# Patient Record
Sex: MALE | URBAN - METROPOLITAN AREA
[De-identification: names, ages, dates, MRNs, and addresses within clinical notes are randomized per-mention and may not be internally consistent; named-entity substitution may affect disease eponyms.]

---

## 2022-01-05 ENCOUNTER — INPATIENT (INPATIENT)
Facility: HOSPITAL | Age: 32
LOS: 5 days | Discharge: HOME | DRG: 885 | End: 2022-01-11
Attending: PSYCHIATRY & NEUROLOGY | Admitting: PSYCHIATRY & NEUROLOGY
Payer: COMMERCIAL

## 2022-01-05 VITALS
TEMPERATURE: 98 F | RESPIRATION RATE: 18 BRPM | SYSTOLIC BLOOD PRESSURE: 129 MMHG | DIASTOLIC BLOOD PRESSURE: 89 MMHG | OXYGEN SATURATION: 97 % | HEART RATE: 82 BPM

## 2022-01-05 RX ORDER — ZOLPIDEM TARTRATE 10 MG/1
5 TABLET ORAL AT BEDTIME
Refills: 0 | Status: DISCONTINUED | OUTPATIENT
Start: 2022-01-05 | End: 2022-01-11

## 2022-01-05 RX ORDER — SERTRALINE 25 MG/1
75 TABLET, FILM COATED ORAL DAILY
Refills: 0 | Status: DISCONTINUED | OUTPATIENT
Start: 2022-01-05 | End: 2022-01-11

## 2022-01-05 RX ORDER — HALOPERIDOL DECANOATE 100 MG/ML
5 INJECTION INTRAMUSCULAR EVERY 6 HOURS
Refills: 0 | Status: DISCONTINUED | OUTPATIENT
Start: 2022-01-05 | End: 2022-01-11

## 2022-01-05 RX ORDER — ACETAMINOPHEN 500 MG
650 TABLET ORAL EVERY 6 HOURS
Refills: 0 | Status: DISCONTINUED | OUTPATIENT
Start: 2022-01-05 | End: 2022-01-11

## 2022-01-05 RX ORDER — LURASIDONE HYDROCHLORIDE 40 MG/1
20 TABLET ORAL DAILY
Refills: 0 | Status: DISCONTINUED | OUTPATIENT
Start: 2022-01-05 | End: 2022-01-06

## 2022-01-05 RX ORDER — MAGNESIUM HYDROXIDE 400 MG/1
30 TABLET, CHEWABLE ORAL DAILY
Refills: 0 | Status: DISCONTINUED | OUTPATIENT
Start: 2022-01-05 | End: 2022-01-11

## 2022-01-05 RX ADMIN — ZOLPIDEM TARTRATE 5 MILLIGRAM(S): 10 TABLET ORAL at 21:37

## 2022-01-05 NOTE — BH PATIENT PROFILE - FALL HARM RISK - UNIVERSAL INTERVENTIONS
Non-slip footwear when patient is out of bed/Physically safe environment - no spills, clutter or unnecessary equipment/Purposeful Proactive Rounding/Room/bathroom lighting operational, light cord in reach

## 2022-01-06 DIAGNOSIS — F39 UNSPECIFIED MOOD [AFFECTIVE] DISORDER: ICD-10-CM

## 2022-01-06 PROCEDURE — 99223 1ST HOSP IP/OBS HIGH 75: CPT

## 2022-01-06 RX ADMIN — ZOLPIDEM TARTRATE 5 MILLIGRAM(S): 10 TABLET ORAL at 22:09

## 2022-01-06 RX ADMIN — LURASIDONE HYDROCHLORIDE 20 MILLIGRAM(S): 40 TABLET ORAL at 09:36

## 2022-01-06 RX ADMIN — SERTRALINE 75 MILLIGRAM(S): 25 TABLET, FILM COATED ORAL at 09:36

## 2022-01-06 NOTE — BH INPATIENT PSYCHIATRY ASSESSMENT NOTE - NSBHCHARTREVIEWVS_PSY_A_CORE FT
Vital Signs Last 24 Hrs  T(C): 36.7 (01-06-22 @ 10:41), Max: 36.7 (01-06-22 @ 10:41)  T(F): 98 (01-06-22 @ 10:41), Max: 98 (01-06-22 @ 10:41)  HR: 98 (01-06-22 @ 10:41) (82 - 98)  BP: 119/86 (01-06-22 @ 10:41) (119/86 - 129/89)  BP(mean): --  RR: 18 (01-06-22 @ 10:41) (18 - 18)  SpO2: 98% (01-06-22 @ 10:41) (97% - 98%)     Vital Signs Last 24 Hrs  T(C): 36.7 (01-06-22 @ 16:58), Max: 36.7 (01-06-22 @ 10:41)  T(F): 98.1 (01-06-22 @ 16:58), Max: 98.1 (01-06-22 @ 16:58)  HR: 81 (01-06-22 @ 16:58) (81 - 98)  BP: 128/82 (01-06-22 @ 16:58) (119/86 - 129/89)  BP(mean): --  RR: 17 (01-06-22 @ 16:58) (17 - 18)  SpO2: 96% (01-06-22 @ 16:58) (96% - 98%)

## 2022-01-06 NOTE — BH INPATIENT PSYCHIATRY ASSESSMENT NOTE - NSPRESENTSXS_PSY_ALL_CORE
Depressed mood/Anhedonia/Hopelessness or despair/Global insomnia Depressed mood/Anhedonia/Hopelessness or despair/Severe anxiety, agitation or panic

## 2022-01-06 NOTE — BH INPATIENT PSYCHIATRY ASSESSMENT NOTE - NSBHASSESSSUMMFT_PSY_ALL_CORE
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 Medical hx significant for familial hypertriglyceridemia, pancreatis, not treated with medications, controlled with diet. Psychiatric hx significant for Bipolar d/o vs MDD, no prior inpatient admissions, currently in treatment with a psychiatrist- receiving zoloft 50mg, latuda 20mg, ambien prn. No currently in psychotherapy. Denies hx of prior suicide attempts, denies SIB. Denies drug/etoh/tobacco use. No hx of legal/criminal issues. Patient presents to [facility] accompanied by his  after expressing SI. Patient transferred to [facility] and admitted to [address] [place] [address].   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 Medical hx significant for familial hypertriglyceridemia, pancreatis, and NIDDM not treated with medications, controlled with diet. Psychiatric hx significant for Bipolar d/o vs MDD, no prior inpatient admissions, currently in treatment with a psychiatrist- receiving zoloft 50mg, latuda 20mg, ambien prn. No currently in psychotherapy. Denies hx of prior suicide attempts, denies SIB. Denies drug/etoh/tobacco use. No hx of legal/criminal issues. Patient [place] [facility] [place] by his  after expressing SI and being unable to complete his tasks (pt had been recently sent home because of his inability co complete his tanks. Patient transferred to [facility] to preserve his privacy and confidentiality and was admitted to [address] [place] [address].   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 Medical hx significant for familial hypertriglyceridemia, pancreatis, and NIDDM not treated with medications, controlled with diet. Psychiatric hx significant for Bipolar d/o vs MDD, no prior inpatient admissions, currently in treatment with a psychiatrist- receiving zoloft 50mg, latuda 20mg, ambien prn. No currently in psychotherapy. Denies hx of prior suicide attempts, denies SIB. Denies drug/etoh/tobacco use. No hx of legal/criminal issues. Patient presented to [facility] accompanied by his  after expressing SI and being unable to complete his tasks (pt had been recently sent home because of his inability co complete his tanks. Patient transferred to [facility] to preserve his privacy and confidentiality and was admitted to [address] [place] [address].    -Zoloft increased from 50mg to 75mg  -Latuda 20mg every evening with meal-plan to uptitrate as appropriate  -Ambien 5mg prn qhs  -Appropriate prns available for activation  -Nutrition consult placed- consistent carb diet ordered

## 2022-01-06 NOTE — BH INPATIENT PSYCHIATRY ASSESSMENT NOTE - NSBHCONSBHPROVDETAILS_PSY_A_CORE  FT
Collateral obtained by ED physician from patient's outpatient psychiatrist who is currently on vacation

## 2022-01-06 NOTE — BH INPATIENT PSYCHIATRY ASSESSMENT NOTE - NSBHMETABOLIC_PSY_ALL_CORE_FT
BMI: BMI (kg/m2): 25.3 (01-05-22 @ 18:42)  HbA1c:   Glucose:   BP: 119/86 (01-06-22 @ 10:41) (119/86 - 129/89)  Lipid Panel:  BMI: BMI (kg/m2): 25.3 (01-05-22 @ 18:42)  HbA1c:   Glucose:   BP: 128/82 (01-06-22 @ 16:58) (119/86 - 129/89)  Lipid Panel:

## 2022-01-06 NOTE — BH INPATIENT PSYCHIATRY ASSESSMENT NOTE - PATIENT'S CHIEF COMPLAINT
Pneumonia (Adult)  Pneumonia is an infection deep within the lungs. It is in the small air sacs (alveoli). Pneumonia may be caused by a virus or bacteria. Pneumonia caused by bacteria is usually treated with an antibiotic. Severe cases may need to be treated in the hospital. Milder cases can be treated at home. Symptoms usually start to get better during the first 2 days of treatment.    Home care  Follow these guidelines when caring for yourself at home:  · Rest at home for the first 2 to 3 days, or until you feel stronger. Dont let yourself get overly tired when you go back to your activities.  · Stay away from cigarette smoke - yours or other peoples.  · You may use acetaminophen or ibuprofen to control fever or pain, unless another medicine was prescribed. If you have chronic liver or kidney disease, talk with your healthcare provider before using these medicines. Also talk with your provider if youve had a stomach ulcer or gastrointestinal bleeding. Dont give aspirin to anyone younger than 18 years of age who is ill with a fever. It may cause severe liver damage.  · Your appetite may be poor, so a light diet is fine.  · Drink 6 to 8 glasses of fluids every day to make sure you are getting enough fluids. Beverages can include water, sport drinks, sodas without caffeine, juices, tea, or soup. Fluids will help loosen secretions in the lung. This will make it easier for you to cough up the phlegm (sputum). If you also have heart or kidney disease, check with your healthcare provider before you drink extra fluids.  · Take antibiotic medicine prescribed until it is all gone, even if you are feeling better after a few days.  Follow-up care  Follow up with your healthcare provider in the next 2 to 3 days, or as advised. This is to be sure the medicine is helping you get better.  If you are 65 or older, you should get a pneumococcal vaccine and a yearly flu (influenza) shot. You should also get these vaccines if  you have chronic lung disease like asthma, emphysema, or COPD. Recently, a second type of pneumonia vaccine has become available for everyone over 65 years old. This is in addition to the previous vaccine. Ask your provider about this.  When to seek medical advice  Call your healthcare provider right away if any of these occur:  · You dont get better within the first 48 hours of treatment  · Shortness of breath gets worse  · Rapid breathing (more than 25 breaths per minute)  · Coughing up blood  · Chest pain gets worse with breathing  · Fever of 100.4°F (38°C) or higher that doesnt get better with fever medicine  · Weakness, dizziness, or fainting that gets worse  · Thirst or dry mouth that gets worse  · Sinus pain, headache, or a stiff neck  · Chest pain not caused by coughing  Date Last Reviewed: 1/1/2017  © 5374-5797 [employer]. [address] [place] [id number]. All rights reserved. This information is not intended as a substitute for professional medical care. Always follow your healthcare professional's instructions.         I'm depressed

## 2022-01-06 NOTE — BH PSYCHOLOGY - ADMISSION/NEUROPSYCHOLOGY NOTE - SUMMARY
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 HAs been feeling depressed and anxious, increasingly, with pandemic, hospital staff shortages, worries that he may do something wrong. Recent stressors include working 12-14 hrs shifts, problems in payroll that he did not get paid in 6 months, recently grandparents passed from covid, has been feeling guilty, overwhelmed, has been feeling irritable. His BDI is 25.Reported at times its hard to concentrate. Denied any plans or intent for suicide, but reported increased preoccupations with death. The best diagnosis at this time is MDD- severe.

## 2022-01-06 NOTE — BH PSYCHOLOGY - ADMISSION/NEUROPSYCHOLOGY NOTE - NSBHPSYCHOLID_PSY_A_CORE FT
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

## 2022-01-06 NOTE — BH INPATIENT PSYCHIATRY ASSESSMENT NOTE - HPI (INCLUDE ILLNESS QUALITY, SEVERITY, DURATION, TIMING, CONTEXT, MODIFYING FACTORS, ASSOCIATED SIGNS AND SYMPTOMS)
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 Medical hx significant for familial hypertriglyceridemia, pancreatis, not treated with medications, controlled with diet. Psychiatric hx significant for Bipolar d/o vs MDD, no prior inpatient admissions, currently in treatment with a psychiatrist- receiving zoloft 50mg, latuda 20mg, ambien prn. No currently in psychotherapy. Denies hx of prior suicide attempts, denies SIB. Denies drug/etoh/tobacco use. No hx of legal/criminal issues. [facility] accompanied by his  after expressing SI. Patient transferred to [facility] and admitted to [address] [place] [address].     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 Medical hx significant for familial hypertriglyceridemia, pancreatis, and NIDDM not treated with medications, controlled with diet. Psychiatric hx significant for Bipolar d/o vs MDD, no prior inpatient admissions, currently in treatment with a psychiatrist- receiving zoloft 50mg, latuda 20mg, ambien prn. No currently in psychotherapy. Denies hx of prior suicide attempts, denies SIB. Denies drug/etoh/tobacco use. No hx of legal/criminal issues. Patient presented to [facility] accompanied by his  after expressing SI. Patient transferred to [facility] to preserve his privacy and confidentialy and was admitted to [address] [place] [address].    Patient states that he recently emailed his  about concerns that he had regarding Covid infection rates in the hospital in addition to remind him about the program needing additional providers due to being short staffed. He reports that the  had him come in the next day and patient reported having suicidal ideations 'no concrete plans, more passive' thoughts. His Director, Dr. Rich subsequently walked him to the [facility] for an evaluation. Per documentation pt had made a statement regarding wanting 'to finish off things.' Prior to this email pt had been counseled by the Director for being too aggressive and irritable towards HR after he expressed frustration to HR after not being paid for the last 6 months and hanging up the phone on the after his most recent attempt to sort this issue out. Patient's family has been helping him financially. 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 He has wondered if he should quit the field. He denies that he has been counselor regarding his work clinically.     He is currently in treatment with his psychiatrist Dr. Willis of 2 years and has been getting sertraline 50mg, Latuda 20mg and ambien 10mg prn for treatment of his depression and 'irritability.' He had been seeing Dr. Willis regularly every 2-3 months, but that has recently changed due to scheduling conflicts and now they are emailing back and forth. He feels that his medications do help and if he were to stop taking them his depression would worsen. He wonders if dosage needs to be adjusted. He irregularly takes his Latuda due to it being expensive and him only being able to get samples, but he does acknowledge improvement in sxs when he does take it. No reported side effects of regimen. His first contact with mental health providers was at age [age] when he was thought to have an adjustment disorder and OCD (both rituals and thoughts). He had been treated with medications (does not recall name) but side effect of weight gain was intolerable. He reports being anxious at baseline, endorsing anxiety in general, frequently ruminating and having multiple racing thoughts.      He states that he started to work as an Intern in May and has been working up to 6 days a week usually from [id number] but staying an additional 2 hours each evening due to workload. He has concerns about Covid and feels that he could personally change the outcome of Covid related deaths. He acknowledged feeling targeted during this pandemic, both at work and outside of work due to his ethnicity.     He denies prior suicide attempts, no self injurious behaviors, no thoughts of harming others. Denies avh or paranoid ideations. Endorses suicidal ideations such as jumping off a tall building or crashing his car. Patient's father  at age 28 in  after driving into a truck, it is unclear if this was a suicide.   Patient denies any hx of drug/etoh/tobacco use.  Has hx of insomnia and regularly uses ambien each night before a work shift. He denies changes in appetite but has gained 18lbs over the last 6 months, unintentionally.    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 Medical hx significant for familial hypertriglyceridemia, pancreatis, and NIDDM not treated with medications, controlled with diet. Psychiatric hx significant for Bipolar d/o vs MDD, no prior inpatient admissions, currently in treatment with a psychiatrist- receiving zoloft 50mg, latuda 20mg, ambien prn. No currently in psychotherapy. Denies hx of prior suicide attempts, denies SIB. Denies drug/etoh/tobacco use. No hx of legal/criminal issues. Patient presented to [facility] accompanied by his  after expressing SI and being unable to complete his tasks (pt had been recently sent home because of his inability co complete his tanks. Patient transferred to [facility] to preserve his privacy and confidentiality and was admitted to [address] [place] [address].    Patient states that he recently emailed his  about concerns that he had regarding Covid infection rates in the hospital in addition to remind him about the program needing additional providers due to being short staffed. He reports that the  had him come in the next day and patient reported having suicidal ideations 'no concrete plans, more passive' thoughts. His Director, Dr. Rich subsequently walked him to the [facility] for an evaluation. Per documentation pt had made a statement regarding wanting 'to finish off things.' Prior to this email pt had been counseled by the Director for being too aggressive and irritable towards HR after he expressed frustration to HR after not being paid for the last 6 months and hanging up the phone on the after his most recent attempt to sort this issue out. Patient's family has been helping him financially. 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 He has wondered if he should quit the field. He denies that he has been counselor regarding his work clinically. He feels hopeless, overwhelmed and guilty related to his abiligy to help patients as needed.    He is currently in treatment with his psychiatrist Dr. Willis of 2 years and has been getting sertraline 50mg, Latuda 20mg and ambien 10mg prn for treatment of his depression and 'irritability.' He had been seeing Dr. Willis regularly every 2-3 months, but that has recently changed due to scheduling conflicts and now they are emailing back and forth. He feels that his medications do help and if he were to stop taking them his depression would worsen. He wonders if dosage needs to be adjusted. He irregularly takes his Latuda due to it being expensive and him only being able to get samples, but he does acknowledge improvement in sxs when he does take it. No reported side effects of regimen. His first contact with mental health providers was at age [age] when he was thought to have an adjustment disorder and OCD (both rituals and thoughts). He had been treated with medications (does not recall name) but side effect of weight gain was intolerable. He reports being anxious at baseline, endorsing anxiety in general, frequently ruminating and having multiple racing thoughts.      He states that he started to work as an Intern in May and has been working up to 6 days a week usually from [id number] but staying an additional 2 hours each evening due to workload. He has concerns about Covid and feels that he could personally change the outcome of Covid related deaths. He acknowledged feeling targeted during this pandemic, both at work and outside of work due to his ethnicity.     He denies prior suicide attempts, no self injurious behaviors, no thoughts of harming others. Denies avh or paranoid ideations. Endorses suicidal ideations such as jumping off a tall building or crashing his car. Patient's father  at age 28 in  after driving into a truck, it is unclear if this was a suicide.   Patient denies any hx of drug/etoh/tobacco use.  Has hx of insomnia and regularly uses ambien each night before a work shift. He denies changes in appetite but has gained 18lbs over the last 6 months, unintentionally.    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 Medical hx significant for familial hypertriglyceridemia, pancreatis, and NIDDM not treated with medications, controlled with diet. Psychiatric hx significant for Bipolar d/o vs MDD, no prior inpatient admissions, currently in treatment with a psychiatrist- receiving zoloft 50mg, latuda 20mg, ambien prn. No currently in psychotherapy. Denies hx of prior suicide attempts, denies SIB. Denies drug/etoh/tobacco use. No hx of legal/criminal issues. Patient presented to [facility] accompanied by his  after expressing SI and being unable to complete his tasks (pt had been recently sent home because of his inability to complete his work related duties. Patient transferred to [facility] to preserve his privacy and confidentiality and was admitted to [address] [place] [address].    Patient states that he recently emailed his  about concerns that he had regarding Covid infection rates in the hospital in addition to reminding him about the program needing additional providers due to being short staffed. He reports that the  had him come in the next day and patient reported having suicidal ideations 'no concrete plans, more passive' thoughts. His Director, Dr. Rich subsequently walked him to the C[facility] for an evaluation. Per documentation pt had made a statement regarding wanting 'to finish off things.' Prior to this email pt had been counseled by the Director for being too aggressive and irritable towards HR after he expressed frustration to HR after not being paid for the last 6 months and hanging up the phone on them after his most recent attempt to sort this issue out. Patient's family has been helping him financially. 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 He has wondered if he should quit the field. He denies that he has been counseled regarding his work clinically. He feels hopeless, overwhelmed and guilty related to his ability to help patients as needed.    He is currently in treatment with his psychiatrist Dr. Willis of 2 years and has been getting sertraline 50mg, Latuda 20mg and ambien 10mg prn for treatment of his depression and 'irritability.' He had been seeing Dr. Willis regularly every 2-3 months, but that has recently changed due to scheduling conflicts and now they are emailing back and forth. He feels that his medications do help and if he were to stop taking them his depression would worsen. He wonders if dosage needs to be adjusted. He irregularly takes his Latuda due to it being expensive and him only being able to get samples, but he does acknowledge improvement in sxs when he does take it. No reported side effects of regimen. His first contact with mental health providers was at age [age] when he was thought to have an adjustment disorder and OCD (both rituals and thoughts). He had been treated with medications (does not recall name) but side effect of weight gain was intolerable. He reports being anxious at baseline, endorsing anxiety in general, frequently ruminating and having multiple racing thoughts.      He states that he started to work as an Intern in May and has been working up to 6 days a week usually from 0105-0012 but staying an additional 2 hours each evening due to workload. He has concerns about Covid and feels that he could personally change the outcome of Covid related deaths. He acknowledged feeling targeted during this pandemic, both at work and outside of work due to his ethnicity.     He denies prior suicide attempts, no self injurious behaviors, no thoughts of harming others. Denies avh or paranoid ideations. Endorses suicidal ideations such as jumping off a tall building or crashing his car. Patient's father  at age 27 in  after driving into a truck, it is unclear if this was a suicide.   Patient denies any hx of drug/etoh/tobacco use.  Has hx of insomnia and regularly uses ambien each night before a work shift. He denies changes in appetite but has gained 18lbs over the last 6 months, unintentionally.    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 Medical hx significant for familial hypertriglyceridemia, pancreatis, and NIDDM not treated with medications, controlled with diet. Psychiatric hx significant for Bipolar d/o vs MDD, no prior inpatient admissions, currently in treatment with a psychiatrist- receiving zoloft 50mg, latuda 20mg, ambien prn. No currently in psychotherapy. Denies hx of prior suicide attempts, denies SIB. Denies drug/etoh/tobacco use. No hx of legal/criminal issues. Patient presented to [facility] accompanied by his  after expressing SI and being unable to complete his tasks (pt had been recently sent home because of his inability to complete his work related duties. Patient transferred to [facility] to preserve his privacy and confidentiality and was admitted to [address] [place] [address].    Patient states that he recently emailed his  about concerns that he had regarding Covid infection rates in the hospital in addition to reminding him about the program needing additional providers due to being short staffed. He reports that the  had him come in the next day and patient reported having suicidal ideations 'no concrete plans, more passive' thoughts. His Director, Dr. Rich subsequently walked him to the CPE[facility] for an evaluation. Per documentation pt had made a statement regarding wanting 'to finish off things.' Prior to this email pt had been counseled by the Director for being too aggressive and irritable towards HR after he expressed frustration to HR after not being paid for the last 6 months and hanging up the phone on them after his most recent attempt to sort this issue out. Patient's family has been helping him financially. 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 He has wondered if he should quit the field. He denies that he has been counseled regarding his work clinically. He feels hopeless, overwhelmed and guilty related to his ability to help patients as needed. Patient is feeling burned out. Also both maternal grandparents recently passed away due to Covid.    He is currently in treatment with his psychiatrist Dr. Willis of 2 years and has been getting sertraline 50mg, Latuda 20mg and ambien 10mg prn for treatment of his depression and 'irritability.' He had been seeing Dr. Willis regularly every 2-3 months, but that has recently changed due to scheduling conflicts and now they are emailing back and forth. He feels that his medications do help and if he were to stop taking them his depression would worsen. He wonders if dosage needs to be adjusted. He irregularly takes his Latuda due to it being expensive and him only being able to get samples, but he does acknowledge improvement in sxs when he does take it. No reported side effects of regimen. His first contact with mental health providers was at age [age] when he was thought to have an adjustment disorder and OCD (both rituals and thoughts). He had been treated with medications (does not recall name) but side effect of weight gain was intolerable. He reports being anxious at baseline, endorsing anxiety in general, frequently ruminating and having multiple racing thoughts.      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 He has concerns about Covid and feels that he could personally change the outcome of Covid related deaths. He acknowledged feeling targeted during this pandemic, both at work and outside of work due to his ethnicity.     He denies prior suicide attempts, no self injurious behaviors, no thoughts of harming others. Denies avh or paranoid ideations. Endorses suicidal ideations such as jumping off a tall building or crashing his car. Patient's father  at age 28 in  after driving into a truck, it is unclear if this was a suicide.   Patient denies any hx of drug/etoh/tobacco use.  Has hx of insomnia and regularly uses ambien each night before a work shift. He denies changes in appetite but has gained 18lbs over the last 6 months, unintentionally.     Collateral obtained from mother Suki [phone] who states that patient shared concern about being sued by his patients due to negligence and understaffing. He has been perseverating and ruminating on him not being able to check bp/temps or transport pts to [employer] etc himself. He has been routinely working until midnight, hours after his shift has ended. He has also been thinking about other people not doing their jobs well enough. She does not believe that he has been taking his medications

## 2022-01-06 NOTE — BH PSYCHOLOGY - ADMISSION/NEUROPSYCHOLOGY NOTE - NSBHSUICIDERISKFT_PSY_ALL_CORE
Static: pot family history of suicide, hx of suicidal ideation, anxiety  Modifiable: current depressive episode and anxiety  Protective: family, intelligent, domiciled, motivated for help

## 2022-01-06 NOTE — BH INPATIENT PSYCHIATRY ASSESSMENT NOTE - RISK ASSESSMENT
Static: Mental illness, gender  Modifiable: current employment/financial issues, current mood sxs, current access to treatment and care  Protective: involved family, residential status, currently has psychiatrist, insight into illness and need for treatment

## 2022-01-06 NOTE — CHART NOTE - NSCHARTNOTEFT_GEN_A_CORE
[facility]  PHYSICAL EXAM: Agree/Declined    VITALS: T(C): 36.6 (01-05-22 @ 18:39), Max: 36.6 (01-05-22 @ 18:39)  HR: 82 (01-05-22 @ 18:39) (82 - 82)  BP: 129/89 (01-05-22 @ 18:39) (129/89 - 129/89)  RR: 18 (01-05-22 @ 18:39) (18 - 18)  SpO2: 97% (01-05-22 @ 18:39) (97% - 97%)      GENERAL: NAD, comfortable, ambulating  HEAD:  Atraumatic, Normocephalic  EYES: EOMI, PERRLA, conjunctiva and sclera clear  ENT: Moist mucous membranes  NECK: Supple, No JVD  CHEST/LUNG: Clear to auscultation bilaterally; No rales, rhonchi, wheezing, or rubs. Unlabored respirations  HEART: Regular rate and rhythm; No murmurs, rubs, or gallops  ABDOMEN: BSx4; Soft, nontender, nondistended  EXTREMITIES:  No clubbing, cyanosis, or edema  NERVOUS SYSTEM:  A&Ox3, no focal deficits   SKIN: No rashes or lesions C  PHYSICAL EXAM: Agree    VITALS: T(C): 36.7 (01-06-22 @ 10:41), Max: 36.7 (01-06-22 @ 10:41)  HR: 98 (01-06-22 @ 10:41) (82 - 98)  BP: 119/86 (01-06-22 @ 10:41) (119/86 - 129/89)  RR: 18 (01-06-22 @ 10:41) (18 - 18)  SpO2: 98% (01-06-22 @ 10:41) (97% - 98%)      GENERAL: NAD, comfortable, ambulating  HEAD:  Atraumatic, Normocephalic  EYES: EOMI, PERRLA, conjunctiva and sclera clear  ENT: Moist mucous membranes  NECK: Supple, No JVD  CHEST/LUNG: Clear to auscultation bilaterally; No rales, rhonchi, wheezing, or rubs. Unlabored respirations  HEART: Regular rate and rhythm; No murmurs, rubs, or gallops  ABDOMEN: BSx4; Soft, nontender, nondistended  EXTREMITIES:  No clubbing, cyanosis, or edema, b/l pulses 2+  NERVOUS SYSTEM:  A&Ox3, no focal deficits   SKIN: No rashes or lesions

## 2022-01-06 NOTE — BH INPATIENT PSYCHIATRY ASSESSMENT NOTE - CURRENT MEDICATION
MEDICATIONS  (STANDING):  lurasidone 20 milliGRAM(s) Oral daily  sertraline 75 milliGRAM(s) Oral daily    MEDICATIONS  (PRN):  acetaminophen     Tablet .. 650 milliGRAM(s) Oral every 6 hours PRN Moderate Pain (4 - 6)  aluminum hydroxide/magnesium hydroxide/simethicone Suspension 30 milliLiter(s) Oral every 4 hours PRN Dyspepsia  haloperidol     Tablet 5 milliGRAM(s) Oral every 6 hours PRN agitation  LORazepam     Tablet 2 milliGRAM(s) Oral every 6 hours PRN agitation  magnesium hydroxide Suspension 30 milliLiter(s) Oral daily PRN Constipation  zolpidem 5 milliGRAM(s) Oral at bedtime PRN Insomnia  zolpidem 5 milliGRAM(s) Oral at bedtime PRN Insomnia   MEDICATIONS  (STANDING):  sertraline 75 milliGRAM(s) Oral daily    MEDICATIONS  (PRN):  acetaminophen     Tablet .. 650 milliGRAM(s) Oral every 6 hours PRN Moderate Pain (4 - 6)  aluminum hydroxide/magnesium hydroxide/simethicone Suspension 30 milliLiter(s) Oral every 4 hours PRN Dyspepsia  haloperidol     Tablet 5 milliGRAM(s) Oral every 6 hours PRN agitation  LORazepam     Tablet 2 milliGRAM(s) Oral every 6 hours PRN agitation  magnesium hydroxide Suspension 30 milliLiter(s) Oral daily PRN Constipation  zolpidem 5 milliGRAM(s) Oral at bedtime PRN Insomnia  zolpidem 5 milliGRAM(s) Oral at bedtime PRN Insomnia

## 2022-01-06 NOTE — BH PSYCHOLOGY - ADMISSION/NEUROPSYCHOLOGY NOTE - HPI (INCLUDE ILLNESS QUALITY, SEVERITY, DURATION, TIMING, CONTEXT, MODIFYING FACTORS, ASSOCIATED SIGNS AND SYMPTOMS)
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 Pt reported that his  said that he was looking at things in catastrophic ways, and was worried about pts well being, so he walked pt tp ed for evaluation, but afterwards, in ED, although they were going to DC pt, they found out about family history of father potentially having had killed himself, so pt is admitted on 2PC status. Pt reported that he had no concrete plans to kill himself, but has been thinking about death more frequently. Pt also has been feeling more easily irritable and frustrated. In the past he had times when he felt suicidal and had thoughts of how he would do it, only when he was faced with the way, such as being on a tall building and thinkign of how he would jump, but more recently, pt reported that he did not have these plans, and did not come up with a plan. Pt reported that he has felt worse than he is feeling at this moment, but being admitted on 2PC status was very upsetting to the patient. 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 Pt reported that at times this causes ruminations, and also pt finds it difficult to concentrate . He has gained about 18 pounds in the last month, most likely due to over eating.     Denied any substance and alcohol use, denied AVH.   Currently seeing Dr. Sawyer for psychopharmacology only, where he is on Sertraline 50mg, Latuda 20 and Ambien 10mg. Pt reported that he had been seeing Dr. Sawyer for the last 2-3 yrs only for meds. At age 17 or 19, he was diagnosed with adjustment disorder and OCD, but the issues were resolved after he went to college. Pt reported that at that time he was also on meds for Bipolar disorder which helped and at one point was diagnosed with Bipolar II vs MDD- Bipolar. But denied any nilsa.   Pt currently feels that the stressors include: being in the hospital and worrying that he will cause a mistake and a tragedy is going to happen, that could have been prevented. 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 PT also reported that he has not been paid in the last 6 months, because there has been a glitch but has given up on trying to resolve, and one day got frustrated with one of the employees and hang up the phone, but was told that his behavior is unacceptable by school. Pt reported that he agrees that he became irritable more recently and gets irritable, which is not how a professional would behave. Pt reported that his family is helping him financially and is going through his savings.     Pt reported that his father may have committed suicide, as he has  in a car crash when pt was 5 and he himself was 28 yrs old, after father told his family that this will be the last time he sees the pt and  in a car crash. Pt reported that family does not talk about mental  health, so its not talked about. Furthermore, pt reported that he was born in [place] and grew up in [facility]. his mother still lives there and he also has a 22 yo and 12 yr old siblings that he has a good relationship with.   Pt reported that Covid pandemic has been difficult, and his maternal grandparents  of Covid in the last couple of months.

## 2022-01-07 PROCEDURE — 99233 SBSQ HOSP IP/OBS HIGH 50: CPT

## 2022-01-07 RX ORDER — LURASIDONE HYDROCHLORIDE 40 MG/1
20 TABLET ORAL
Refills: 0 | Status: DISCONTINUED | OUTPATIENT
Start: 2022-01-07 | End: 2022-01-11

## 2022-01-07 RX ADMIN — ZOLPIDEM TARTRATE 5 MILLIGRAM(S): 10 TABLET ORAL at 22:12

## 2022-01-07 RX ADMIN — LURASIDONE HYDROCHLORIDE 20 MILLIGRAM(S): 40 TABLET ORAL at 19:58

## 2022-01-07 RX ADMIN — SERTRALINE 75 MILLIGRAM(S): 25 TABLET, FILM COATED ORAL at 09:56

## 2022-01-07 NOTE — BH INPATIENT PSYCHIATRY PROGRESS NOTE - NSBHCHARTREVIEWVS_PSY_A_CORE FT
Vital Signs Last 24 Hrs  T(C): 37.1 (01-07-22 @ 10:08), Max: 37.1 (01-07-22 @ 10:08)  T(F): 98.8 (01-07-22 @ 10:08), Max: 98.8 (01-07-22 @ 10:08)  HR: 70 (01-07-22 @ 10:08) (70 - 81)  BP: 129/85 (01-07-22 @ 10:08) (128/82 - 129/85)  BP(mean): --  RR: 20 (01-07-22 @ 10:08) (17 - 20)  SpO2: 97% (01-07-22 @ 10:08) (96% - 97%)

## 2022-01-07 NOTE — BH INPATIENT PSYCHIATRY PROGRESS NOTE - NSBHMETABOLIC_PSY_ALL_CORE_FT
BMI: BMI (kg/m2): 25.3 (01-05-22 @ 18:42)  HbA1c:   Glucose:   BP: 129/85 (01-07-22 @ 10:08) (119/86 - 129/89)  Lipid Panel:

## 2022-01-07 NOTE — PSYCHIATRIC REHAB INITIAL EVALUATION - NSBHPRTOOLBOX_PSY_ALL_CORE
Playing piano (doing less so now that he is working), online lobo with brothers, walking in the park/Entertainment/Family support/Outdoor activity/Reading/Other

## 2022-01-08 PROCEDURE — 99232 SBSQ HOSP IP/OBS MODERATE 35: CPT

## 2022-01-08 RX ADMIN — ZOLPIDEM TARTRATE 5 MILLIGRAM(S): 10 TABLET ORAL at 21:27

## 2022-01-08 RX ADMIN — SERTRALINE 75 MILLIGRAM(S): 25 TABLET, FILM COATED ORAL at 09:57

## 2022-01-08 RX ADMIN — LURASIDONE HYDROCHLORIDE 20 MILLIGRAM(S): 40 TABLET ORAL at 19:20

## 2022-01-08 NOTE — BH INPATIENT PSYCHIATRY PROGRESS NOTE - NSBHCHARTREVIEWVS_PSY_A_CORE FT
Vital Signs Last 24 Hrs  T(C): 36.8 (01-08-22 @ 17:20), Max: 36.8 (01-08-22 @ 17:20)  T(F): 98.3 (01-08-22 @ 17:20), Max: 98.3 (01-08-22 @ 17:20)  HR: 85 (01-08-22 @ 17:20) (78 - 85)  BP: 125/84 (01-08-22 @ 17:20) (125/84 - 145/83)  BP(mean): --  RR: 18 (01-08-22 @ 17:20) (18 - 18)  SpO2: 96% (01-08-22 @ 17:20) (96% - 96%)

## 2022-01-08 NOTE — BH INPATIENT PSYCHIATRY PROGRESS NOTE - NSBHMETABOLIC_PSY_ALL_CORE_FT
BMI: BMI (kg/m2): 25.3 (01-05-22 @ 18:42)  HbA1c:   Glucose:   BP: 125/84 (01-08-22 @ 17:20) (119/86 - 145/83)  Lipid Panel:

## 2022-01-09 PROCEDURE — 99232 SBSQ HOSP IP/OBS MODERATE 35: CPT

## 2022-01-09 RX ADMIN — LURASIDONE HYDROCHLORIDE 20 MILLIGRAM(S): 40 TABLET ORAL at 17:27

## 2022-01-09 RX ADMIN — SERTRALINE 75 MILLIGRAM(S): 25 TABLET, FILM COATED ORAL at 10:08

## 2022-01-09 RX ADMIN — ZOLPIDEM TARTRATE 5 MILLIGRAM(S): 10 TABLET ORAL at 21:27

## 2022-01-09 NOTE — BH INPATIENT PSYCHIATRY PROGRESS NOTE - NSBHCHARTREVIEWVS_PSY_A_CORE FT
Vital Signs Last 24 Hrs  T(C): 36.8 (01-08-22 @ 17:20), Max: 36.8 (01-08-22 @ 17:20)  T(F): 98.3 (01-08-22 @ 17:20), Max: 98.3 (01-08-22 @ 17:20)  HR: 85 (01-08-22 @ 17:20) (78 - 85)  BP: 125/84 (01-08-22 @ 17:20) (125/84 - 145/83)  BP(mean): --  RR: 18 (01-08-22 @ 17:20) (18 - 18)  SpO2: 96% (01-08-22 @ 17:20) (96% - 96%)     Vital Signs Last 24 Hrs  T(C): 37.1 (01-09-22 @ 09:37), Max: 37.1 (01-09-22 @ 09:37)  T(F): 98.7 (01-09-22 @ 09:37), Max: 98.7 (01-09-22 @ 09:37)  HR: 84 (01-09-22 @ 09:37) (84 - 85)  BP: 122/88 (01-09-22 @ 09:37) (122/88 - 125/84)  BP(mean): --  RR: 18 (01-08-22 @ 17:20) (18 - 18)  SpO2: 96% (01-09-22 @ 09:37) (96% - 96%)

## 2022-01-09 NOTE — BH INPATIENT PSYCHIATRY PROGRESS NOTE - NSBHMETABOLIC_PSY_ALL_CORE_FT
BMI: BMI (kg/m2): 25.3 (01-05-22 @ 18:42)  HbA1c:   Glucose:   BP: 125/84 (01-08-22 @ 17:20) (119/86 - 145/83)  Lipid Panel:  BMI: BMI (kg/m2): 25.3 (01-05-22 @ 18:42)  HbA1c:   Glucose:   BP: 122/88 (01-09-22 @ 09:37) (122/88 - 145/83)  Lipid Panel:

## 2022-01-10 LAB — SARS-COV-2 RNA SPEC QL NAA+PROBE: SIGNIFICANT CHANGE UP

## 2022-01-10 PROCEDURE — 99232 SBSQ HOSP IP/OBS MODERATE 35: CPT

## 2022-01-10 RX ADMIN — LURASIDONE HYDROCHLORIDE 20 MILLIGRAM(S): 40 TABLET ORAL at 17:29

## 2022-01-10 RX ADMIN — SERTRALINE 75 MILLIGRAM(S): 25 TABLET, FILM COATED ORAL at 09:26

## 2022-01-10 RX ADMIN — ZOLPIDEM TARTRATE 5 MILLIGRAM(S): 10 TABLET ORAL at 21:21

## 2022-01-10 NOTE — BH DISCHARGE NOTE NURSING/SOCIAL WORK/PSYCH REHAB - PATIENT PORTAL LINK FT
You can access the FollowMyHealth Patient Portal offered by [facility] by registering at the following website: [email or web address]. By joining [facility]’s FollowMyHealth portal, you will also be able to view your health information using other applications (apps) compatible with our system.

## 2022-01-10 NOTE — BH INPATIENT PSYCHIATRY PROGRESS NOTE - NSBHMETABOLIC_PSY_ALL_CORE_FT
BMI: BMI (kg/m2): 25.3 (01-05-22 @ 18:42)  HbA1c:   Glucose:   BP: 120/83 (01-10-22 @ 08:30) (117/84 - 145/83)  Lipid Panel:

## 2022-01-10 NOTE — BH INPATIENT PSYCHIATRY PROGRESS NOTE - NSICDXBHTERTIARYDX_PSY_ALL_CORE
R/O Bipolar disorder   F31.9  

## 2022-01-10 NOTE — BH INPATIENT PSYCHIATRY PROGRESS NOTE - PRN MEDS
MEDICATIONS  (PRN):  acetaminophen     Tablet .. 650 milliGRAM(s) Oral every 6 hours PRN Moderate Pain (4 - 6)  aluminum hydroxide/magnesium hydroxide/simethicone Suspension 30 milliLiter(s) Oral every 4 hours PRN Dyspepsia  haloperidol     Tablet 5 milliGRAM(s) Oral every 6 hours PRN agitation  LORazepam     Tablet 2 milliGRAM(s) Oral every 6 hours PRN agitation  magnesium hydroxide Suspension 30 milliLiter(s) Oral daily PRN Constipation  zolpidem 5 milliGRAM(s) Oral at bedtime PRN Insomnia  zolpidem 5 milliGRAM(s) Oral at bedtime PRN Insomnia  

## 2022-01-10 NOTE — BH INPATIENT PSYCHIATRY PROGRESS NOTE - NSBHFUPINTERVALCCFT_PSY_A_CORE
This medicine thing is a wash
ongoing treatment of depression 

## 2022-01-10 NOTE — BH INPATIENT PSYCHIATRY PROGRESS NOTE - NSDCCRITERIA_PSY_ALL_CORE
Patient will report improvement in sxs

## 2022-01-10 NOTE — BH INPATIENT PSYCHIATRY PROGRESS NOTE - NSBHASSESSSUMMFT_PSY_ALL_CORE
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 Medical hx significant for familial hypertriglyceridemia, pancreatis, and NIDDM not treated with medications, controlled with diet. Psychiatric hx significant for Bipolar d/o vs MDD, no prior inpatient admissions, currently in treatment with a psychiatrist- receiving zoloft 50mg, latuda 20mg, ambien prn. No currently in psychotherapy. Denies hx of prior suicide attempts, denies SIB. Denies drug/etoh/tobacco use. No hx of legal/criminal issues. Patient presented to [facility] accompanied by his  after expressing SI and being unable to complete his tasks (pt had been recently sent home because of his inability co complete his tanks. Patient transferred to [facility] to preserve his privacy and confidentiality and was admitted to [address] [place] [address].    -Zoloft 75mg qd  -Latuda 20mg every evening with meal-plan to uptitrate as appropriate  -Ambien 5mg prn qhs  -Appropriate prns available for activation  -Nutrition consult placed- consistent carb diet ordered
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 Medical hx significant for familial hypertriglyceridemia, pancreatis, and NIDDM not treated with medications, controlled with diet. Psychiatric hx significant for Bipolar d/o vs MDD, no prior inpatient admissions, currently in treatment with a psychiatrist- receiving zoloft 50mg, latuda 20mg, ambien prn. No currently in psychotherapy. Denies hx of prior suicide attempts, denies SIB. Denies drug/etoh/tobacco use. No hx of legal/criminal issues. Patient presented to [facility] accompanied by his  after expressing SI and being unable to complete his tasks (pt had been recently sent home because of his inability co complete his tanks. Patient transferred to [facility] to preserve his privacy and confidentiality and was admitted to [address] [place] [address].    -Zoloft 75mg qd  -Latuda 20mg every evening with meal-plan to uptitrate as appropriate  -Ambien 5mg prn qhs  -Appropriate prns available for activation  -Nutrition consult placed- consistent carb diet ordered
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 Medical hx significant for familial hypertriglyceridemia, pancreatis, and NIDDM not treated with medications, controlled with diet. Psychiatric hx significant for Bipolar d/o vs MDD, no prior inpatient admissions, currently in treatment with a psychiatrist- receiving zoloft 50mg, latuda 20mg, ambien prn. No currently in psychotherapy. Denies hx of prior suicide attempts, denies SIB. Denies drug/etoh/tobacco use. No hx of legal/criminal issues. Patient presented to [facility] accompanied by his  after expressing SI and being unable to complete his tasks (pt had been recently sent home because of his inability co complete his tanks. Patient transferred to [facility] to preserve his privacy and confidentiality and was admitted to [address] [place] [address].    ;;01/09: mostly stays in room; somewhat avoidant and guarded; feels depressed but Not endorsing  suicidal or homicidal ideation intent or plans; no mention of auditory or visual hallucinations  Thinking is congruent with affect; no peculiarities of thinking or language use.   Continue current regime; may need increase in Zoloft and Latuda for effectiveness.  (See below)     -Zoloft 75mg qd  -Latuda 20mg every evening with meal-plan to uptitrate as appropriate  -Ambien 5mg prn qhs  -Appropriate prns available for activation  -Nutrition consult placed- consistent carb diet ordered
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 Medical hx significant for familial hypertriglyceridemia, pancreatis, and NIDDM not treated with medications, controlled with diet. Psychiatric hx significant for Bipolar d/o vs MDD, no prior inpatient admissions, currently in treatment with a psychiatrist- receiving zoloft 50mg, latuda 20mg, ambien prn. No currently in psychotherapy. Denies hx of prior suicide attempts, denies SIB. Denies drug/etoh/tobacco use. No hx of legal/criminal issues. Patient presented to [facility] accompanied by his  after expressing SI and being unable to complete his tasks (pt had been recently sent home because of his inability co complete his tanks. Patient transferred to [facility] to preserve his privacy and confidentiality and was admitted to [address] [place] [address].    ;;01/09: mostly stays in room; somewhat avoidant and guarded; feels depressed but Not endorsing  suicidal or homicidal ideation intent or plans; no mention of auditory or visual hallucinations  Thinking is congruent with affect; no peculiarities of thinking or language use.   Continue current regime; may need increase in Zoloft and Latuda for effectiveness.  (See below)     -Zoloft 75mg qd  -Latuda 20mg every evening with meal-plan to uptitrate as appropriate  -Ambien 5mg prn qhs  -Appropriate prns available for activation  -Nutrition consult placed- consistent carb diet ordered

## 2022-01-10 NOTE — BH INPATIENT PSYCHIATRY PROGRESS NOTE - NSBHMSEKNOWHOW_PSY_ALL_CORE
Educational attainment/Vocabulary

## 2022-01-10 NOTE — BH INPATIENT PSYCHIATRY PROGRESS NOTE - NSBHATTESTSEENBY_PSY_A_CORE
NP without Attending Psychiatrist
attending Psychiatrist without NP/Trainee
NP without Attending Psychiatrist
attending Psychiatrist without NP/Trainee

## 2022-01-10 NOTE — BH INPATIENT PSYCHIATRY PROGRESS NOTE - NSCGISEVERILLNESS_PSY_ALL_CORE
Statement Selected
5 = Markedly ill - intrusive symptoms that distinctly impair social/occupational function or cause intrusive levels of distress

## 2022-01-10 NOTE — BH INPATIENT PSYCHIATRY PROGRESS NOTE - NSTXDEPRESGOALOTHER_PSY_ALL_CORE
Patient will stabilize mood and alleviate symptoms of depression to engage in discharge planning.

## 2022-01-10 NOTE — BH DISCHARGE NOTE NURSING/SOCIAL WORK/PSYCH REHAB - NSDCPRGOAL_PSY_ALL_CORE
Pt. has been pleasant and well-related throughout admission.  Pt. has spent most of his time reading books and has stated that he enjoys reading very much.  Pt. has been receptive to therapeutic materials on mindfulness and communication.  Pt. has been receptive to individual therapeutic interactions.

## 2022-01-10 NOTE — BH DISCHARGE NOTE NURSING/SOCIAL WORK/PSYCH REHAB - CAREGIVER ADDRESS
EXAM DESCRIPTION:  U/S BREAST UNILAT LIMITED



COMPLETED DATE/TIME:  9/19/2019 1:10 pm



REASON FOR STUDY:  N63.20 UNSPECIFIED LUMP IN THE LEFT BREAST, UNSPECIFIED QUADRANT N63.20  UNSPECIFI
ED LUMP IN THE LEFT BREAST, UNSPECIFIED QUAD



COMPARISON:  None.



TECHNIQUE:  Real-time and static grayscale imaging performed of the left breast targeted to the area 
of clinical concern.  Patient is currently breastfeeding.  Selected color Doppler images recorded.



LIMITATIONS:  None.



FINDINGS:  MASS: No mass identified.  Normal glandular tissue.

OTHER: Patient is currently breast-feeding.  Dilated retroareolar ducts are present.  A cyst with sin
gle septation and swirling debris is present in the central retroareolar region, 6 x 6 x 3 cm in size
.  This could either represent a galactocele or massively dilated duct.



IMPRESSION:  Palpable abnormality left breast retroareolar region correlates with a septated cyst or 
duct, 6 x 6 x 3 cm in size.  Re-evaluation with ultrasound after the patient stops breastfeeding is r
ecommended



BIRAD:  3 Probably benign finding. Initial short-interval follow-up suggested.



RECOMMENDATION:  RECOMMENDED FOLLOW-UP: Six-month follow-up ultrasound, or ultrasound 3 months after 
cessation of breastfeeding



COMMENT:  The American College of Radiology (ACR) has developed recommendations for screening MRI of 
the breasts in certain patient populations, to be used in conjunction with mammography.  Breast MRI s
urveillance may be appropriate for women with more than 20% lifetime risk of developing breast cancer
  as determined by genetic testing, significant family history of the disease, or history of mantle r
adiation for Hodgkins Disease.  ACR Practice Guidelines 2008.



TECHNICAL DOCUMENTATION:  JOB ID:  [id number]

 2011 [employer]- All Rights Reserved



Reading location - IP/workstation name: [facility]-
[address] [place] [address], [place] [id number]

## 2022-01-10 NOTE — BH INPATIENT PSYCHIATRY PROGRESS NOTE - NSTXANXGOAL_PSY_ALL_CORE
Report a reduction in panic attacks and improving mood and confidence
Be able to participate in activities despite lingering anxiety/panic

## 2022-01-10 NOTE — BH CHART NOTE - NSPSYPRGNOTEFT_PSY_ALL_CORE
PSYCHOLOGY CLINICIAN PROGRESS NOTE      SUBJECTIVE (IN THE PATIENT’S WORDS):  I feel better 	  OBJECTIVE: Patient has had time to reflect during his time on the unit. Intially came in due to suicidual identians with passive thoughts. Symptoms have improved. Patient has reached out to his support system and knows that he will have to rely on them. The patient feels an overwhelming pressure to choose the correct career path, which leads him to have ruminating thoughts at night. He also states that he provides himself with a lot of busy work during the day to avoid thoughts. The patient described feelings of hopelessness and guilt tied to his feelings of inadequacy in his current position, however reported that he feels more hopeful since he came to the hospital, and reflected that he feels brighter, and thought process is “slower”. Reflected that he was having distorted feelings about self and stressors, and he is able to reflect on his process today.  The therapist completed the safety plan with the patient to help him identify the triggers and warning signs. The patient acknowledges that he needs to communicate sooner and not ignore the signs we have talked about. He will contact his  or siblings if he is in crisis. He also has identified several coping mechanisms, which include meditation. The patient had previously tried therapy but stated that he did not attend long enough to see progress. He is willing to start therapy and continue working with his psychiatrist.         MENTAL STATUS EXAM    APPEARANCE:  [x] adequately groomed [] disheveled [] malodorous [] Other:   BEHAVIOR: [x] cooperative [] uncooperative [] good EC [] poor EC [] well related [] oddly related [] guarded []PMA [] PMR []abnormal movements [] Other:   SPEED: [x] normal rate/rhythm/volume [] loud [] quiet [] slow [] rapid [] pressured [] Other: _________   MOOD: [] euthymic [x] dysphoric [] anxious [] irritable [] Other: ___________   AFFECT: [x] full [] expansive [] constricted [] blunted [] flat [] stable [] labile [] Other: ________________ THOUGHT PROCESS: []x organized [] disorganized [] goal-directed [] concrete [] logical [] illogical   [] circumstantial [] tangential [] impoverished [] effusive [] repetitive [] Other:  THOUGHT CONTENT: [x] negative for delusions/suicidal ideation /homicidal ideation [] positive for delusions/suicidal ideation/homicidal ideation Describe:   PERCEPTION: [x] negative for auditory/ visual hallucinations [] positive for auditory/ visual hallucinations Describe: ________________________________________________________   INSIGHT/JUDGMENT: [x] good []fair [] poor        IMPULSE CONTROL: [x] good []fair [] poor     COGNITION: [x] alert and oriented to person, time, place [] Lacks orientation to person/time/place. 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 The patient has  past psychiatric history of depression and anxiety and past medical history of prediabetes.       Risk assessment as applicable (consider static vs modifiable risk factors and protective factors; comment on level of risk for dangerous behavior): The patient’s modifiable risk factor is his intelligence and ability to logically work through his current issues, and realizing when he needs to reach out for help. Having the support of his family, the , and the Chief of Neuro are individuals that he can lean on during his stressful periods. He acknowledges that it is not rational not to reach out to his support system during his moments of crisis. He will include them in the decision making process of which career path to pursue. The patient’s acute risk is low, and suicide symptoms were mitigated.       [] suicide [] self-harm [] elopement [] aggression [] Other:   [] ideation	 [] intent	 [] plan   [] prior incidences (if checked, elaborate)  [] family history of suicide	[] family history of aggression    Static Hx of suicidal ideation, depression and anxiety  Modifiable current depressive episode  Protective identifies reasons for living, supportive family, domiciled.     IF SI PRESENT AT ANY TIME DURING ADMISSION:  CAMS: [] assessment [] intervention [] refused   [] stabilization plan completed  	     PLAN: [] I/G/M therapy : as available;  [] psychopharmacotherapy with the inpatient psychiatry   [] discharge planning in collaboration with social work –   [] family meeting   [] collateral contact   [] psychoeducation   [] Comments:                          Case discussed with [employer], inpatient psychology the treatment team .          PSYCHOLOGY CLINICIAN PROGRESS NOTE      SUBJECTIVE (IN THE PATIENT’S WORDS):  I feel better 	  OBJECTIVE: Patient has had time to reflect during his time on the unit. Intially came in due to suicidual identians with passive thoughts. Symptoms have improved. Patient has reached out to his support system and knows that he will have to rely on them. The patient feels an overwhelming pressure to choose the correct career path, which leads him to have ruminating thoughts at night. He also states that he provides himself with a lot of busy work during the day to avoid thoughts. The patient described feelings of hopelessness and guilt tied to his feelings of inadequacy in his current position, however reported that he feels more hopeful since he came to the hospital, and reflected that he feels brighter, and thought process is “slower”. Reflected that he was having distorted feelings about self and stressors, and he is able to reflect on his process today.  The therapist completed the safety plan with the patient to help him identify the triggers and warning signs. The patient acknowledges that he needs to communicate sooner and not ignore the signs we have talked about. He will contact his  or siblings if he is in crisis. He also has identified several coping mechanisms, which include meditation. The patient had previously tried therapy but stated that he did not attend long enough to see progress. He is willing to start therapy and continue working with his psychiatrist.         MENTAL STATUS EXAM    APPEARANCE:  [x] adequately groomed [] disheveled [] malodorous [] Other:   BEHAVIOR: [x] cooperative [] uncooperative [] good EC [] poor EC [] well related [] oddly related [] guarded []PMA [] PMR []abnormal movements [] Other:   SPEED: [x] normal rate/rhythm/volume [] loud [] quiet [] slow [] rapid [] pressured [] Other: _________   MOOD: [] euthymic [x] dysphoric [] anxious [] irritable [] Other: ___________   AFFECT: [x] full [] expansive [] constricted [] blunted [] flat [] stable [] labile [] Other: ________________ THOUGHT PROCESS: []x organized [] disorganized [] goal-directed [] concrete [] logical [] illogical   [] circumstantial [] tangential [] impoverished [] effusive [] repetitive [] Other:  THOUGHT CONTENT: [x] negative for delusions/suicidal ideation /homicidal ideation [] positive for delusions/suicidal ideation/homicidal ideation Describe:   PERCEPTION: [x] negative for auditory/ visual hallucinations [] positive for auditory/ visual hallucinations Describe: ________________________________________________________   INSIGHT/JUDGMENT: [x] good []fair [] poor        IMPULSE CONTROL: [x] good []fair [] poor     COGNITION: [x] alert and oriented to person, time, place [] Lacks orientation to person/time/place. 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 The patient has  past psychiatric history of depression and anxiety and past medical history of prediabetes.       Risk assessment as applicable (consider static vs modifiable risk factors and protective factors; comment on level of risk for dangerous behavior): The patient’s modifiable risk factor is his intelligence and ability to logically work through his current issues, and realizing when he needs to reach out for help. Having the support of his family, the , and the Chief of Neuro are individuals that he can lean on during his stressful periods. He acknowledges that it is not rational not to reach out to his support system during his moments of crisis. He will include them in the decision making process of which career path to pursue. The patient’s acute risk is low, and suicide symptoms were mitigated.       [] suicide [] self-harm [] elopement [] aggression [] Other:   [] ideation	 [] intent	 [] plan   [] prior incidences (if checked, elaborate)  [] family history of suicide	[] family history of aggression    Static Hx of suicidal ideation, depression and anxiety  Modifiable current depressive episode  Protective identifies reasons for living, supportive family, domiciled.     IF SI PRESENT AT ANY TIME DURING ADMISSION:  CAMS: [] assessment [] intervention [] refused   [] stabilization plan completed  	     PLAN: [] I/G/M therapy : as available;  [] psychopharmacotherapy with the inpatient psychiatry   [] discharge planning in collaboration with social work –   [] family meeting   [] collateral contact   [] psychoeducation   [] Comments:                          Case discussed with [employer], inpatient psychology the treatment team .       Created by [employer]

## 2022-01-10 NOTE — BH INPATIENT PSYCHIATRY PROGRESS NOTE - NSBHINPTBILLING_PSY_ALL_CORE
70782 - Inpatient High Complexity
22488 - Inpatient Moderate Complexity
55490 - Inpatient Moderate Complexity
46658 - Inpatient Moderate Complexity

## 2022-01-10 NOTE — BH INPATIENT PSYCHIATRY PROGRESS NOTE - NSBHFUPINTERVALHXFT_PSY_A_CORE
Pt found reading [employer] and making excellent progress in it (many hundred pages in). He says he feels better and he seems a little brighter. -SI. We discussed pros and cons of returning to work vs putting it off. He still seems very emotionally cut off. 
Patient visible on the unit, seen in his room. Calm and cooperative on approach, initially is reading a book on his bed. Pleasant focused on discharge and dispo planning. He is amenable to referral to [facility] for outpatient treatment. States that his family are anticipating him being discharged either today or tomorrow and he also feels that that would be appropriate. Has been tolerating increase in zoloft well without issue. We discussed his med regimen as well as the potential for increase in his medications post discharge for optimal treatment for his depression and he was in agreeement. No reported si/hi/avh or paranoid ideation. Future oriented, wanting to know if his Director Dr. Rich had given writer some sort of time frame that pt had to return to work by.   
Patient visible in the milieu today, cooperative on approach, he reports improvement in mood which he attributes to being away from his stressor and the recent increase in his zoloft to 75mg daily. He had a session with psychology today which he found to be beneficial as several points were brought up that has helped him to achieve clarity in regards to his future. 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 Sleep and appetite have been appropriate. He feels bored on the unit without much distraction and is hoping that his family will bring books etc for the weekend. He is tolerating the recent increase of zoloft well without reported side effects. He denies si/hi/avh or paranoid ideation. Is in agreement with discharge plan for Monday/Tuesday once appointments with his psychiatrist and a referral to a psychologist has been made.   Patient requested that writer make contact with his  of Neurology to ascertain it pt will be able to take absence to focus on mental health before going back to work. Dr. Hernando marquez was contacted and shared that pt had not followed through with HR after beginning his job to fill out insurance forms and as a result he does not have insurance. However the latricia is working on having insurance cover his stay. Forms will be sent for pt to fill out and return. Pt of course is encouraged to take as much time as is needed off. Writer also spoke with pt's mother Suki [phone] who requested an update and has now delayed her flight back to [place] till Wednesday so that she can see pt after discharge. 
Not endorsing  suicidal or homicidal ideation intent or plans; no mention of auditory or visual hallucinations  ; states he is depressed nonetheless; discussed medications and dosing. stays in room.  fair eye contact. Thinking is congruent with affect; no peculiarities of thinking or language use.  i&j fair : aware of medications and acknowledges symptoms but not reflective in a meaningful way  on issues that impact on symptoms.

## 2022-01-10 NOTE — BH INPATIENT PSYCHIATRY PROGRESS NOTE - NSBHCHARTREVIEWVS_PSY_A_CORE FT
Vital Signs Last 24 Hrs  T(C): 36.6 (01-10-22 @ 08:30), Max: 36.9 (01-09-22 @ 17:00)  T(F): 97.8 (01-10-22 @ 08:30), Max: 98.4 (01-09-22 @ 17:00)  HR: 91 (01-10-22 @ 08:30) (81 - 91)  BP: 120/83 (01-10-22 @ 08:30) (117/84 - 120/83)  BP(mean): --  RR: 18 (01-10-22 @ 08:30) (18 - 18)  SpO2: 97% (01-10-22 @ 08:30) (97% - 97%)

## 2022-01-10 NOTE — BH INPATIENT PSYCHIATRY PROGRESS NOTE - NSTXSUICIDGOAL_PSY_ALL_CORE
Will verbalize a decrease in preoccupation with suicidal thoughts and / or intent to commit suicide to 2 on a 10-point scale

## 2022-01-10 NOTE — BH INPATIENT PSYCHIATRY PROGRESS NOTE - NSICDXBHPRIMARYDX_PSY_ALL_CORE
Major depression   F32.9  

## 2022-01-10 NOTE — BH INPATIENT PSYCHIATRY PROGRESS NOTE - CURRENT MEDICATION
MEDICATIONS  (STANDING):  lurasidone 20 milliGRAM(s) Oral <User Schedule>  sertraline 75 milliGRAM(s) Oral daily    MEDICATIONS  (PRN):  acetaminophen     Tablet .. 650 milliGRAM(s) Oral every 6 hours PRN Moderate Pain (4 - 6)  aluminum hydroxide/magnesium hydroxide/simethicone Suspension 30 milliLiter(s) Oral every 4 hours PRN Dyspepsia  haloperidol     Tablet 5 milliGRAM(s) Oral every 6 hours PRN agitation  LORazepam     Tablet 2 milliGRAM(s) Oral every 6 hours PRN agitation  magnesium hydroxide Suspension 30 milliLiter(s) Oral daily PRN Constipation  zolpidem 5 milliGRAM(s) Oral at bedtime PRN Insomnia  zolpidem 5 milliGRAM(s) Oral at bedtime PRN Insomnia  
MEDICATIONS  (STANDING):  lurasidone 20 milliGRAM(s) Oral <User Schedule>  sertraline 75 milliGRAM(s) Oral daily    MEDICATIONS  (PRN):  acetaminophen     Tablet .. 650 milliGRAM(s) Oral every 6 hours PRN Moderate Pain (4 - 6)  aluminum hydroxide/magnesium hydroxide/simethicone Suspension 30 milliLiter(s) Oral every 4 hours PRN Dyspepsia  haloperidol     Tablet 5 milliGRAM(s) Oral every 6 hours PRN agitation  LORazepam     Tablet 2 milliGRAM(s) Oral every 6 hours PRN agitation  magnesium hydroxide Suspension 30 milliLiter(s) Oral daily PRN Constipation  zolpidem 5 milliGRAM(s) Oral at bedtime PRN Insomnia  zolpidem 5 milliGRAM(s) Oral at bedtime PRN Insomnia  
MEDICATIONS  (STANDING):  sertraline 75 milliGRAM(s) Oral daily    MEDICATIONS  (PRN):  acetaminophen     Tablet .. 650 milliGRAM(s) Oral every 6 hours PRN Moderate Pain (4 - 6)  aluminum hydroxide/magnesium hydroxide/simethicone Suspension 30 milliLiter(s) Oral every 4 hours PRN Dyspepsia  haloperidol     Tablet 5 milliGRAM(s) Oral every 6 hours PRN agitation  LORazepam     Tablet 2 milliGRAM(s) Oral every 6 hours PRN agitation  magnesium hydroxide Suspension 30 milliLiter(s) Oral daily PRN Constipation  zolpidem 5 milliGRAM(s) Oral at bedtime PRN Insomnia  zolpidem 5 milliGRAM(s) Oral at bedtime PRN Insomnia  
MEDICATIONS  (STANDING):  lurasidone 20 milliGRAM(s) Oral <User Schedule>  sertraline 75 milliGRAM(s) Oral daily    MEDICATIONS  (PRN):  acetaminophen     Tablet .. 650 milliGRAM(s) Oral every 6 hours PRN Moderate Pain (4 - 6)  aluminum hydroxide/magnesium hydroxide/simethicone Suspension 30 milliLiter(s) Oral every 4 hours PRN Dyspepsia  haloperidol     Tablet 5 milliGRAM(s) Oral every 6 hours PRN agitation  LORazepam     Tablet 2 milliGRAM(s) Oral every 6 hours PRN agitation  magnesium hydroxide Suspension 30 milliLiter(s) Oral daily PRN Constipation  zolpidem 5 milliGRAM(s) Oral at bedtime PRN Insomnia  zolpidem 5 milliGRAM(s) Oral at bedtime PRN Insomnia

## 2022-01-10 NOTE — BH INPATIENT PSYCHIATRY PROGRESS NOTE - NSBHCHARTREVIEWLAB_PSY_A_CORE FT
Urinalysis WNL  UDS negative  A1c  Lipid panel

## 2022-01-10 NOTE — BH DISCHARGE NOTE NURSING/SOCIAL WORK/PSYCH REHAB - NSCDUDCCRISIS_PSY_A_CORE
[facility] Well  1 [phone]) [facility]-WELL ([phone])  Text "WELL" to 78736  Website: www.[employer]/.[employer] [facility] 1 (256) 271-FQFB (1700) Website: www.safehorizon.org/.National Suicide Prevention Lifeline 5 (580) 812-3651/.  [facility]  1 (380) LIFENET (830-7227)/.  [facility]’s [facility]  [address] [place] [address] [phone]   Hours:  Monday through Friday from 9 AM to 3 PM/.  [facility] [facility]  [employer] - [facility] Crisis Line  8 [phone]) 429-5840, Option 1 [facility] Well  1 (226) [facility]-WELL ([phone])  Text "WELL" to 32925  Website: www.[employer]/.[employer] [facility] 1 (293) 331-FDJJ (4096) Website: www.safehorizon.org/.National Suicide Prevention Lifeline 6 (708) 509-3223/.  [facility]  1 (947) LIFENET (257-9218)/.  [facility]’s Behavioral Health Crisis Center  [address] [place] [address] [phone]   Hours:  Monday through Friday from 9 AM to 3 PM

## 2022-01-11 VITALS
HEART RATE: 85 BPM | DIASTOLIC BLOOD PRESSURE: 67 MMHG | TEMPERATURE: 98 F | SYSTOLIC BLOOD PRESSURE: 138 MMHG | RESPIRATION RATE: 18 BRPM | OXYGEN SATURATION: 98 %

## 2022-01-11 PROCEDURE — U0003: CPT

## 2022-01-11 PROCEDURE — U0005: CPT

## 2022-01-11 RX ORDER — SERTRALINE 25 MG/1
3 TABLET, FILM COATED ORAL
Qty: 42 | Refills: 0
Start: 2022-01-11 | End: 2022-01-24

## 2022-01-11 RX ORDER — ZOLPIDEM TARTRATE 10 MG/1
1 TABLET ORAL
Qty: 14 | Refills: 0
Start: 2022-01-11 | End: 2022-01-24

## 2022-01-11 RX ORDER — ZOLPIDEM TARTRATE 10 MG/1
1 TABLET ORAL
Qty: 0 | Refills: 0 | DISCHARGE
Start: 2022-01-11

## 2022-01-11 RX ORDER — LURASIDONE HYDROCHLORIDE 40 MG/1
1 TABLET ORAL
Qty: 14 | Refills: 0
Start: 2022-01-11 | End: 2022-01-24

## 2022-01-11 RX ORDER — SERTRALINE 25 MG/1
3 TABLET, FILM COATED ORAL
Qty: 0 | Refills: 0 | DISCHARGE
Start: 2022-01-11

## 2022-01-11 RX ADMIN — SERTRALINE 75 MILLIGRAM(S): 25 TABLET, FILM COATED ORAL at 10:21

## 2022-01-11 NOTE — BH PSYCHOLOGY - CLINICIAN PSYCHOTHERAPY NOTE - NSBHPSYCHOLNARRATIVE_PSY_A_CORE FT
APPEARANCE:  [X] adequately groomed [] disheveled [] malodorous [] Other:   BEHAVIOR: [X] cooperative [] uncooperative [] good EC [] poor EC [] well related [] oddly related [] guarded []PMA [] PMR []abnormal movements [] Other:   SPEED: [X] normal rate/rhythm/volume [] loud [] quiet [] slow [] rapid [] pressured [] Other: _________   MOOD: [] euthymic [X] dysphoric [X] anxious [] irritable [] Other: ___________   AFFECT: [X] full [] expansive [] constricted [] blunted [] flat [] stable [] labile [] Other: ________________ THOUGHT PROCESS: [] organized [] disorganized [] goal-directed [] concrete [] logical [] illogical   [] circumstantial [] tangential [] impoverished [] effusive [] repetitive [] Other:  THOUGHT CONTENT: [X] negative for delusions/suicidal ideation /homicidal ideation [] positive for delusions/suicidal ideation/homicidal ideation Describe:   PERCEPTION: [X] negative for auditory/ visual hallucinations [] positive for auditory/ visual hallucinations Describe: ________________________________________________________   INSIGHT/JUDGMENT: [X] good []fair [] poor        IMPULSE CONTROL: [X] good []fair [] poor     COGNITION: [X] alert and oriented to person, time, place [] Lacks orientation to person/time/place. Describe: ___________________________      Risk assessment as applicable (consider static vs modifiable risk factors and protective factors; comment on level of risk for dangerous behavior):    [] suicide [] self-harm [] elopement [] aggression [] Other:   [] ideation           [] intent              [] plan   [] prior incidences (if checked, elaborate)  [X] family history of suicide            [] family history of aggression    Static   Work Conditions   Diagnosis of MDD    Modifiable   Depressive Symptoms     Protective  Family Support     Pt is a single 31 year old  American male, domiciled with one roommate, employed as a Neurology intern at [employer]. Medical hx significant for familial hypertriglyceridemia, pancreatis, and NIDDM not treated with medications, controlled with diet. Psychiatric hx significant for Bipolar d/o vs MDD, no prior inpatient admissions, currently in treatment with a psychiatrist- receiving zoloft 50mg, latuda 20mg, ambien prn. No currently in psychotherapy. Denies hx of prior suicide attempts, denies SIB. Denies drug/etoh/tobacco use. No hx of legal/criminal issues. Patient presented to [facility] accompanied by his  after expressing SI and being unable to complete his tasks (pt had been recently sent home because of his inability co complete his tanks. Patient transferred to [facility] to preserve his privacy and confidentiality and was admitted to [address] [place] [address]. Pt was seen on unit and was calm and cooperative for the duration of the session. Pt and therapist reflected on his expectations about discharge and he reported feeling like it would be an adjustment period but he is looking forward to leaving. Pt continued to report that he had some continued reflection to do in deciding the direction of his career path but was going to "collect the necessary data" and take advisement from friends and family. Pt felt confident in his ability to make a career decision that was best for him.
MENTAL STATUS EXAM    APPEARANCE:  [X] adequately groomed [] disheveled [] malodorous [] Other:   BEHAVIOR: [X] cooperative [] uncooperative [] good EC [] poor EC [] well related [] oddly related [] guarded []PMA [] PMR []abnormal movements [] Other:   SPEED: [X] normal rate/rhythm/volume [] loud [] quiet [] slow [] rapid [] pressured [] Other: _________   MOOD: [] euthymic [X] dysphoric [X] anxious [] irritable [] Other: ___________   AFFECT: [X] full [] expansive [] constricted [] blunted [] flat [] stable [] labile [] Other: ________________ THOUGHT PROCESS: [] organized [] disorganized [] goal-directed [] concrete [] logical [] illogical   [] circumstantial [] tangential [] impoverished [] effusive [] repetitive [] Other:  THOUGHT CONTENT: [X] negative for delusions/suicidal ideation /homicidal ideation [] positive for delusions/suicidal ideation/homicidal ideation Describe:   PERCEPTION: [X] negative for auditory/ visual hallucinations [] positive for auditory/ visual hallucinations Describe: ________________________________________________________   INSIGHT/JUDGMENT: [X] good []fair [] poor        IMPULSE CONTROL: [X] good []fair [] poor     COGNITION: [X] alert and oriented to person, time, place [] Lacks orientation to person/time/place. Describe: ___________________________      Risk assessment as applicable (consider static vs modifiable risk factors and protective factors; comment on level of risk for dangerous behavior):    [] suicide [] self-harm [] elopement [] aggression [] Other:   [] ideation           [] intent              [] plan   [] prior incidences (if checked, elaborate)  [X] family history of suicide            [] family history of aggression    Static   Work Conditions   Diagnosis of MDD    Modifiable   Depressive Symptoms     Protective  Family Support     Pt is a single 31 year old  American male, domiciled with one roommate, 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 Medical hx significant for familial hypertriglyceridemia, pancreatis, and NIDDM not treated with medications, controlled with diet. Psychiatric hx significant for Bipolar d/o vs MDD, no prior inpatient admissions, currently in treatment with a psychiatrist- receiving zoloft 50mg, latuda 20mg, ambien prn. No currently in psychotherapy. Denies hx of prior suicide attempts, denies SIB. Denies drug/etoh/tobacco use. No hx of legal/criminal issues. Patient presented to [facility] accompanied by his  after expressing SI and being unable to complete his tasks (pt had been recently sent home because of his inability co complete his tanks. Patient transferred to [facility] to preserve his privacy and confidentiality and was admitted to [address] [place] [address]. Pt was seen at beside and was calm and cooperative for the duration of the session. CAMS was initiated with pt. Pt completed CAMS and was thoughtful and reflective. Pt denied and current SI/HI. Pt reported feeling better than he had at admission. When asked reflect on why he said he was able to get some time to rest, get some exercise and read. Pt appeared understanding of why he was admitted to the hospital but insists his SI was passive with no plan or intent. Pt reported having anxiety about his work conditions especially with the increase cases in COVID-19. 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 Pt was thoughtful in his reflections.

## 2022-01-11 NOTE — BH INPATIENT PSYCHIATRY DISCHARGE NOTE - NSDCMRMEDTOKEN_GEN_ALL_CORE_FT
sertraline 25 mg oral tablet: 3 tab(s) orally once a day  zolpidem 5 mg oral tablet: 1 tab(s) orally once a day (at bedtime), As needed, Insomnia MDD:Max daily dose of 2 tabs if 1 tab is ineffective

## 2022-01-11 NOTE — BH INPATIENT PSYCHIATRY DISCHARGE NOTE - NSDCCPCAREPLAN_GEN_ALL_CORE_FT
PRINCIPAL DISCHARGE DIAGNOSIS  Diagnosis: Recurrent severe major depressive disorder with anxiety  Assessment and Plan of Treatment: Continue current medication regimen and follow with aftercare appointments

## 2022-01-11 NOTE — BH PSYCHOLOGY - CLINICIAN PSYCHOTHERAPY NOTE - NSTXSUICIDGOAL_PSY_ALL_CORE
Will verbalize a decrease in preoccupation with suicidal thoughts and / or intent to commit suicide to 2 on a 10-point scale
Talk to staff and ask for assistance when having suicidal wishes instead of acting out

## 2022-01-11 NOTE — BH INPATIENT PSYCHIATRY DISCHARGE NOTE - NSBHMETABOLIC_PSY_ALL_CORE_FT
BMI: BMI (kg/m2): 25.3 (01-05-22 @ 18:42)  HbA1c:   Glucose:   BP: 120/84 (01-10-22 @ 16:15) (117/84 - 125/84)  Lipid Panel:

## 2022-01-11 NOTE — BH INPATIENT PSYCHIATRY DISCHARGE NOTE - HPI (INCLUDE ILLNESS QUALITY, SEVERITY, DURATION, TIMING, CONTEXT, MODIFYING FACTORS, ASSOCIATED SIGNS AND SYMPTOMS)
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 Pt reported that his  said that he was looking at things in catastrophic ways, and was worried about pts well being, so he walked pt tp ed for evaluation, but afterwards, in ED, although they were going to DC pt, they found out about family history of father potentially having had killed himself, so pt is admitted on 2PC status. Pt reported that he had no concrete plans to kill himself, but has been thinking about death more frequently. Pt also has been feeling more easily irritable and frustrated. In the past he had times when he felt suicidal and had thoughts of how he would do it, only when he was faced with the way, such as being on a tall building and thinkign of how he would jump, but more recently, pt reported that he did not have these plans, and did not come up with a plan. Pt reported that he has felt worse than he is feeling at this moment, but being admitted on 2PC status was very upsetting to the patient. 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 Pt reported that at times this causes ruminations, and also pt finds it difficult to concentrate . He has gained about 18 pounds in the last month, most likely due to over eating.     Denied any substance and alcohol use, denied AVH.   Currently seeing Dr. Sawyer for psychopharmacology only, where he is on Sertraline 50mg, Latuda 20 and Ambien 10mg. Pt reported that he had been seeing Dr. Sawyer for the last 2-3 yrs only for meds. At age 17 or 19, he was diagnosed with adjustment disorder and OCD, but the issues were resolved after he went to college. Pt reported that at that time he was also on meds for Bipolar disorder which helped and at one point was diagnosed with Bipolar II vs MDD- Bipolar. But denied any nilsa.   Pt currently feels that the stressors include: being in the hospital and worrying that he will cause a mistake and a tragedy is going to happen, that could have been prevented. 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 PT also reported that he has not been paid in the last 6 months, because there has been a glitch but has given up on trying to resolve, and one day got frustrated with one of the employees and hang up the phone, but was told that his behavior is unacceptable by school. Pt reported that he agrees that he became irritable more recently and gets irritable, which is not how a professional would behave. Pt reported that his family is helping him financially and is going through his savings.     Pt reported that his father may have committed suicide, as he has  in a car crash when pt was 5 and he himself was 28 yrs old, after father told his family that this will be the last time he sees the pt and  in a car crash. Pt reported that family does not talk about mental  health, so its not talked about. Furthermore, pt reported that he was born in [place] and grew up in [facility]. his mother still lives there and he also has a 22 yo and 12 yr old siblings that he has a good relationship with.   Pt reported that Covid pandemic has been difficult, and his maternal grandparents  of Covid in the last couple of months.

## 2022-01-15 DIAGNOSIS — F33.2 MAJOR DEPRESSIVE DISORDER, RECURRENT SEVERE WITHOUT PSYCHOTIC FEATURES: ICD-10-CM

## 2022-01-15 DIAGNOSIS — F32.2 MAJOR DEPRESSIVE DISORDER, SINGLE EPISODE, SEVERE WITHOUT PSYCHOTIC FEATURES: ICD-10-CM

## 2022-01-15 DIAGNOSIS — F41.8 OTHER SPECIFIED ANXIETY DISORDERS: ICD-10-CM

## 2022-01-15 DIAGNOSIS — Z79.899 OTHER LONG TERM (CURRENT) DRUG THERAPY: ICD-10-CM

## 2022-01-15 DIAGNOSIS — Z81.8 FAMILY HISTORY OF OTHER MENTAL AND BEHAVIORAL DISORDERS: ICD-10-CM

## 2023-08-26 NOTE — BH INPATIENT PSYCHIATRY PROGRESS NOTE - NSBHMSETHTCONTENT_PSY_A_CORE
Preoccupations/Ruminations/Hopelessness/Guilt
Yes

## 2024-08-15 NOTE — BH INPATIENT PSYCHIATRY ASSESSMENT NOTE - NS ED BHA REVIEW OF ED CHART AVAILABLE LABS REVIEWED
Addended by: ANNIE MARTÍNEZ on: 8/15/2024 02:28 PM     Modules accepted: Orders    
Patient transferred from [facility] for eval of pneumonia. Per patient mother, patient has had a cough, low grade fever, and runny nose for the past few days. Last night she reports his fever spiked to 102. At [facility], patient was given breathing treatments, but remained tachypneic and labored. On arrival, patient tachypneic, belly breathing noted. Patient calm and resting on mother. Room air sat 97-98. No distress noted.   
Yes